# Patient Record
Sex: MALE | Race: WHITE | NOT HISPANIC OR LATINO | URBAN - METROPOLITAN AREA
[De-identification: names, ages, dates, MRNs, and addresses within clinical notes are randomized per-mention and may not be internally consistent; named-entity substitution may affect disease eponyms.]

---

## 2019-04-23 ENCOUNTER — PRE VISIT (OUTPATIENT)
Dept: UROLOGY | Facility: CLINIC | Age: 34
End: 2019-04-23

## 2019-04-23 NOTE — TELEPHONE ENCOUNTER
MEDICAL RECORDS REQUEST   [facility]  Urology Clinic  [address] [place] [address]  PHONE: [phone]  Fax: [phone]        FUTURE VISIT INFORMATION                                                   RADHA Mcdaniel: 1985 scheduled for future visit at [facility]    APPOINTMENT INFORMATION:    Date: 19 5PM    Provider:  Nida Kimble RN    Reason for Visit/Diagnosis: Blood in semen, still discolored after a couple of weeks. Having discomfort in RT testicle.     REFERRAL INFORMATION:    Referring provider:  Self    Specialty: N/A    Referring providers clinic:  N/A    Clinic contact number:  N/A    RECORDS REQUESTED FOR VISIT                                                     NOTES  STATUS/DETAILS   OFFICE NOTE from referring provider  no   OFFICE NOTE from other specialist  no   DISCHARGE SUMMARY from hospital  no   DISCHARGE REPORT from the ER  no   OPERATIVE REPORT  no   MEDICATION LIST  no       PRE-VISIT CHECKLIST      Record collection complete Yes - Per pt no outside recs   Appointment appropriately scheduled           (right time/right provider) Yes   MyChart activation If no, please explain: In Process   Questionnaire complete If no, please explain: In Process     Completed by: Glenis Montesinos

## 2019-05-06 NOTE — PROGRESS NOTES
"        Chief Complaint:   Hematospermia and right testicular pain          History of Present Illness:   Terrance Larsen is a 33 year old male who reports that he experienced blood in his semen for 3 weeks duration. However, this has since ceased. He states that this was initially brian red blood, but over the course of the 3 weeks, this became a darker, \"dried blood.\" He has also had these \"dried blood\" episodes in the past that lasted one day at a time. He also had right-sided testicular pain in association with this, and was seen at an outside clinic. STI testing was done and was negative. This pain has now also ceased. His semen is now mostly clear. He denies pain with urination. He states that he currently takes amlodipine for hypertension, and also takes fish oil daily.          Past Medical History:   Amlodipine 5 mg tablet         Past Surgical History:   Reviewed--none significant         Medications     Current Outpatient Medications   Medication     amLODIPine (NORVASC) 5 MG tablet     No current facility-administered medications for this visit.             Family History:   Reviewed--none pertinent         Social History:     Social History     Socioeconomic History     Marital status: Single     Spouse name: Not on file     Number of children: Not on file     Years of education: Not on file     Highest education level: Not on file   Occupational History     Not on file   Social Needs     Financial resource strain: Not on file     Food insecurity:     Worry: Not on file     Inability: Not on file     Transportation needs:     Medical: Not on file     Non-medical: Not on file   Tobacco Use     Smoking status: Former Smoker     Smokeless tobacco: Former User   Substance and Sexual Activity     Alcohol use: Not on file     Drug use: Not on file     Sexual activity: Not on file   Lifestyle     Physical activity:     Days per week: Not on file     Minutes per session: Not on file     Stress: Not on file " "  Relationships     Social connections:     Talks on phone: Not on file     Gets together: Not on file     Attends Yarsani service: Not on file     Active member of club or organization: Not on file     Attends meetings of clubs or organizations: Not on file     Relationship status: Not on file     Intimate partner violence:     Fear of current or ex partner: Not on file     Emotionally abused: Not on file     Physically abused: Not on file     Forced sexual activity: Not on file   Other Topics Concern     Not on file   Social History Narrative     Not on file            Allergies:   Codeine         Review of Systems:  From intake questionnaire   Negative 14 system review except as noted on HPI, nurse's note.         Physical Exam:   Patient is a 33 year old male   Vitals: Blood pressure 138/81, pulse 69, height 1.905 m (6' 3\"), weight 104.3 kg (230 lb).  General Appearance Adult: Alert, no acute distress, oriented  Lungs: no respiratory distress, or pursed lip breathing  Heart: No obvious jugular venous distension present  Abdomen: soft, nontender, no organomegaly or masses, Body mass index is 28.75 kg/m .  Skin: no suspicious lesions or rashes  Neuro: Alert, oriented, speech and mentation normal  : deferred      Labs and Pathology:    I personally reviewed all applicable laboratory data       Imaging:    I personally reviewed all applicable imaging          Assessment and Plan:     Assessment:   33 year old male with a history of hematospermia for three weeks, along with right-sided testicular pain, which have both now ceased. We discussed that these conditions are oftentimes benign and self-limiting.         Plan:   -Monitor this for now  -If hematospermia begins again, and lasts for longer than one month, may pursue a TRUS or MRI  -If testicular pain returns, can pursue scrotal US         Nida Kimble, CNP  Department of Urology   "

## 2019-05-08 ENCOUNTER — PRE VISIT (OUTPATIENT)
Dept: UROLOGY | Facility: CLINIC | Age: 34
End: 2019-05-08

## 2019-05-08 ENCOUNTER — OFFICE VISIT (OUTPATIENT)
Dept: UROLOGY | Facility: CLINIC | Age: 34
End: 2019-05-08
Payer: COMMERCIAL

## 2019-05-08 VITALS
BODY MASS INDEX: 28.6 KG/M2 | WEIGHT: 230 LBS | DIASTOLIC BLOOD PRESSURE: 81 MMHG | HEART RATE: 69 BPM | SYSTOLIC BLOOD PRESSURE: 138 MMHG | HEIGHT: 75 IN

## 2019-05-08 DIAGNOSIS — R36.1 HEMATOSPERMIA: Primary | ICD-10-CM

## 2019-05-08 DIAGNOSIS — N50.819 TESTICULAR PAIN: ICD-10-CM

## 2019-05-08 RX ORDER — AMLODIPINE BESYLATE 5 MG/1
5 TABLET ORAL
COMMUNITY
Start: 2017-12-16

## 2019-05-08 ASSESSMENT — ENCOUNTER SYMPTOMS
LEG PAIN: 0
EXERCISE INTOLERANCE: 0
SLEEP DISTURBANCES DUE TO BREATHING: 0
HYPOTENSION: 0
ORTHOPNEA: 0
PALPITATIONS: 0
LIGHT-HEADEDNESS: 0
HYPERTENSION: 1
SYNCOPE: 0

## 2019-05-08 ASSESSMENT — MIFFLIN-ST. JEOR: SCORE: 2073.9

## 2019-05-08 ASSESSMENT — PAIN SCALES - GENERAL: PAINLEVEL: NO PAIN (0)

## 2019-05-08 NOTE — TELEPHONE ENCOUNTER
Chief Complaint : New-Referred by Self    New Hx/Sx: Hematospermia/R testicle pain     Records/Orders/Proced: N/A    Pt Contacted: N/A    At Rooming: Normal

## 2019-05-08 NOTE — LETTER
Date:May 13, 2019      Patient was self referred, no letter generated. Do not send.        [facility] Information

## 2019-05-08 NOTE — LETTER
"5/8/2019       RE: Terrance Larsen  [address] [address]     Dear Colleague,    Thank you for referring your patient, Terrance Larsen, to the [facility] UROLOGY AND INST FOR PROSTATE AND UROLOGIC CANCERS at [facility]. Please see a copy of my visit note below.            Chief Complaint:   Hematospermia and right testicular pain          History of Present Illness:   Terrance Larsen is a 33 year old male who reports that he experienced blood in his semen for 3 weeks duration. However, this has since ceased. He states that this was initially brian red blood, but over the course of the 3 weeks, this became a darker, \"dried blood.\" He has also had these \"dried blood\" episodes in the past that lasted one day at a time. He also had right-sided testicular pain in association with this, and was seen at an outside clinic. STI testing was done and was negative. This pain has now also ceased. His semen is now mostly clear. He denies pain with urination. He states that he currently takes amlodipine for hypertension, and also takes fish oil daily.          Past Medical History:   Amlodipine 5 mg tablet         Past Surgical History:   Reviewed--none significant         Medications     Current Outpatient Medications   Medication     amLODIPine (NORVASC) 5 MG tablet     No current facility-administered medications for this visit.             Family History:   Reviewed--none pertinent         Social History:     Social History     Socioeconomic History     Marital status: Single     Spouse name: Not on file     Number of children: Not on file     Years of education: Not on file     Highest education level: Not on file   Occupational History     Not on file   Social Needs     Financial resource strain: Not on file     Food insecurity:     Worry: Not on file     Inability: Not on file     Transportation needs:     Medical: Not on file     Non-medical: Not on file   Tobacco Use     Smoking " "status: Former Smoker     Smokeless tobacco: Former User   Substance and Sexual Activity     Alcohol use: Not on file     Drug use: Not on file     Sexual activity: Not on file   Lifestyle     Physical activity:     Days per week: Not on file     Minutes per session: Not on file     Stress: Not on file   Relationships     Social connections:     Talks on phone: Not on file     Gets together: Not on file     Attends Scientologist service: Not on file     Active member of club or organization: Not on file     Attends meetings of clubs or organizations: Not on file     Relationship status: Not on file     Intimate partner violence:     Fear of current or ex partner: Not on file     Emotionally abused: Not on file     Physically abused: Not on file     Forced sexual activity: Not on file   Other Topics Concern     Not on file   Social History Narrative     Not on file            Allergies:   Codeine         Review of Systems:  From intake questionnaire   Negative 14 system review except as noted on HPI, nurse's note.         Physical Exam:   Patient is a 33 year old male   Vitals: Blood pressure 138/81, pulse 69, height 1.905 m (6' 3\"), weight 104.3 kg (230 lb).  General Appearance Adult: Alert, no acute distress, oriented  Lungs: no respiratory distress, or pursed lip breathing  Heart: No obvious jugular venous distension present  Abdomen: soft, nontender, no organomegaly or masses, Body mass index is 28.75 kg/m .  Skin: no suspicious lesions or rashes  Neuro: Alert, oriented, speech and mentation normal  : deferred      Labs and Pathology:    I personally reviewed all applicable laboratory data       Imaging:    I personally reviewed all applicable imaging          Assessment and Plan:     Assessment:   33 year old male with a history of hematospermia for three weeks, along with right-sided testicular pain, which have both now ceased. We discussed that these conditions are oftentimes benign and self-limiting.       "   Plan:   -Monitor this for now  -If hematospermia begins again, and lasts for longer than one month, may pursue a TRUS or MRI  -If testicular pain returns, can pursue scrotal US         Nida Kimble CNP  Department of Urology     Again, thank you for allowing me to participate in the care of your patient.      Sincerely,    Nida Kimble, CNP

## 2019-09-09 ENCOUNTER — NURSE TRIAGE (OUTPATIENT)
Dept: NURSING | Facility: CLINIC | Age: 34
End: 2019-09-09

## 2019-09-09 ENCOUNTER — OFFICE VISIT (OUTPATIENT)
Dept: UROLOGY | Facility: CLINIC | Age: 34
End: 2019-09-09
Payer: COMMERCIAL

## 2019-09-09 ENCOUNTER — ANCILLARY PROCEDURE (OUTPATIENT)
Dept: ULTRASOUND IMAGING | Facility: CLINIC | Age: 34
End: 2019-09-09
Attending: NURSE PRACTITIONER
Payer: COMMERCIAL

## 2019-09-09 VITALS
BODY MASS INDEX: 26.73 KG/M2 | DIASTOLIC BLOOD PRESSURE: 80 MMHG | HEART RATE: 74 BPM | SYSTOLIC BLOOD PRESSURE: 142 MMHG | HEIGHT: 75 IN | WEIGHT: 215 LBS

## 2019-09-09 DIAGNOSIS — R36.1 HEMATOSPERMIA: ICD-10-CM

## 2019-09-09 DIAGNOSIS — R36.1 HEMATOSPERMIA: Primary | ICD-10-CM

## 2019-09-09 RX ORDER — MULTIVITAMIN/IRON/FOLIC ACID 18MG-0.4MG
1 TABLET ORAL
COMMUNITY

## 2019-09-09 ASSESSMENT — PAIN SCALES - GENERAL: PAINLEVEL: NO PAIN (0)

## 2019-09-09 ASSESSMENT — MIFFLIN-ST. JEOR: SCORE: 2000.86

## 2019-09-09 NOTE — NURSING NOTE
"Return-  Hx of hematospermia/ R testicular pain     He states over the past month he has had blood in his semen, starting early August, and thinks it is getting worse.  He denies any pain with erections or climax along with trouble getting or maintaining erections.      Chief Complaint   Patient presents with     RECHECK     Hematospermia       Blood pressure (!) 142/80, pulse 74, height 1.905 m (6' 3\"), weight 97.5 kg (215 lb). Body mass index is 26.87 kg/m .    There is no problem list on file for this patient.      Allergies   Allergen Reactions     Codeine Rash       Current Outpatient Medications   Medication Sig Dispense Refill     amLODIPine (NORVASC) 5 MG tablet Take 5 mg by mouth       Multiple Vitamins-Minerals (CENTROVITE) TABS Take 1 tablet by mouth         Social History     Tobacco Use     Smoking status: Former Smoker     Smokeless tobacco: Former User   Substance Use Topics     Alcohol use: Yes     Drug use: None       Juan Jose Croft, EMT, EMT  9/9/2019  3:09 PM      "

## 2019-09-09 NOTE — TELEPHONE ENCOUNTER
[facility]: Nurse Triage Note  SITUATION/BACKGROUND                                                      Terrance Larsen is a 34 year old male who calls with blood in semen.    Description:  Blood in semen characteristics change--color will change from bright red to brownish purple/watery   Onset/duration:  Been ongoing for the last month  Precip. factors:  Experienced blood in semen last spring  Associated symptoms:  No force behind ejaculation   Improves/worsens symptoms:  no  Pain scale (0-10)   0/10    MEDICATIONS:   Taking medication(s) as prescribed? Yes  Taking over the counter medication(s?) No  Any barriers to taking medication(s) as prescribed?  No  Medication(s) improving/managing symptoms?  No    Allergies:   Allergies   Allergen Reactions     Codeine Rash       ASSESSMENT      34 year old male calls with blood in his semen x 4 weeks.  He states his semen is very watery.  He states -No force behind ejaculation.  The color changes week to week  It can be bright red in color to dark red to purplish.  He denies pain.  States he may experience a little discomfort in his right scrotum from time to time.  He denies swelling or fever.      He is scheduled for a f/u with Nida Kimble CNP at the end of the month.  He would really like to see her when he is experieincing the bloody discharge.     Will forward to urology to see if he can be added in to her clinic today...     RECOMMENDATION/PLAN                                                      RECOMMENDED DISPOSITION:  See above  Will comply with recommendation: Yes    If further questions/concerns or if symptoms do not improve, worsen or new symptoms develop, call your PCP or [phone] to talk with the Resident on call, as soon as possible.    Guideline used: 528  Telephone Triage Protocols for Nurses, Fifth Edition, Olimpia Vizcarra, RN, RN

## 2019-09-09 NOTE — PROGRESS NOTES
"        Chief Complaint:   Hematospermia and right testicular pain          History of Present Illness:   Terrance Larsen is a 33 year old male who presents for follow up regarding hematospermia. I last saw him in the urology clinic on 5/8/19 for this issue. Prior this visit, he had experienced hematospermia for 3 weeks, but it had resolved by the time I saw him. At that visit, he stated that this was initially brian red blood, but over the course of the 3 weeks, this became a darker, \"dried blood.\" He has also had these \"dried blood\" episodes in the past that lasted one day at a time. He also had right-sided testicular pain in association with this, and was seen at an outside clinic. STI testing was done and was negative. This pain had also ceased.     Today, he states that his hematospermia returned in early August, initially with small pieces of brown material in his semen. Over the past several weeks, this has progressed from streaks of brian blood present to his entire ejaculate volume being dark maroon in color. He provides a photo today of his semen's dark maroon/brown appearance. He does also acknowledge some mild, ongoing right-sided scrotal pain. This is present today. The force of his ejaculation has also reduced, and he states that his semen now \"dribbles out.\" He otherwise denies any other symptoms, including abdominal, pelvic, or rectal pain. He denies any associated urinary symptoms.          Past Medical History:   Amlodipine 5 mg tablet         Past Surgical History:   Reviewed--none significant         Medications     Current Outpatient Medications   Medication     amLODIPine (NORVASC) 5 MG tablet     Multiple Vitamins-Minerals (CENTROVITE) TABS     No current facility-administered medications for this visit.             Family History:   Reviewed--none pertinent         Social History:     Social History     Socioeconomic History     Marital status: Single     Spouse name: Not on file     Number of " "children: Not on file     Years of education: Not on file     Highest education level: Not on file   Occupational History     Not on file   Social Needs     Financial resource strain: Not on file     Food insecurity:     Worry: Not on file     Inability: Not on file     Transportation needs:     Medical: Not on file     Non-medical: Not on file   Tobacco Use     Smoking status: Former Smoker     Smokeless tobacco: Former User   Substance and Sexual Activity     Alcohol use: Yes     Drug use: Not on file     Sexual activity: Not Currently   Lifestyle     Physical activity:     Days per week: Not on file     Minutes per session: Not on file     Stress: Not on file   Relationships     Social connections:     Talks on phone: Not on file     Gets together: Not on file     Attends Mandaen service: Not on file     Active member of club or organization: Not on file     Attends meetings of clubs or organizations: Not on file     Relationship status: Not on file     Intimate partner violence:     Fear of current or ex partner: Not on file     Emotionally abused: Not on file     Physically abused: Not on file     Forced sexual activity: Not on file   Other Topics Concern     Parent/sibling w/ CABG, MI or angioplasty before 65F 55M? Not Asked   Social History Narrative     Not on file            Allergies:   Codeine         Review of Systems:  From intake questionnaire   Negative 14 system review except as noted on HPI, nurse's note.         Physical Exam:   Patient is a 33 year old male   Vitals: Blood pressure (!) 142/80, pulse 74, height 1.905 m (6' 3\"), weight 97.5 kg (215 lb).  General Appearance Adult: Alert, no acute distress, oriented  Lungs: no respiratory distress, or pursed lip breathing  Heart: No obvious jugular venous distension present  Abdomen: soft, nontender, no organomegaly or masses, Body mass index is 26.87 kg/m .  Skin: no suspicious lesions or rashes  : Testicles of normal size, no indurated areas on " exam. Nontender with exam.   DENISE: Prostate firm, smooth, nontender with palpation.       Labs and Pathology:    I personally reviewed all applicable laboratory data       Imaging:    I personally reviewed all applicable imaging          Assessment and Plan:     Assessment:   33 year old male with recurrent hematospermia that has now lasted longer than one month. He also reports mild pain on the right side of his scrotum. No obvious abnormality on exam today. Given the duration of his current symptoms, further evaluation is needed.       Plan:   -Obtain a testicular ultrasound to rule out pathology within the scrotum.  -Will also have patient return for a TRUS in clinic with Dr. Hawley to evaluate the prostate and surrounding structures. Next steps to be determined at that time.          Nida Kimble, CNP  Department of Urology

## 2019-09-09 NOTE — PATIENT INSTRUCTIONS
UROLOGY CLINIC VISIT PATIENT INSTRUCTIONS    1) We will obtain a scrotal ultrasound today to evaluate these structures for abnormality.  2) We will also have you return for a transrectal ultrasound with Dr. Hawley to look closely at the prostate and surrounding structures.    If you have any issues, questions or concerns in the meantime, do not hesitate to contact us at [phone] or via [employer].     It was a pleasure meeting with you today.  Thank you for allowing me and my team the privilege of caring for you today.  YOU are the reason we are here, and I truly hope we provided you with the excellent service you deserve.  Please let us know if there is anything else we can do for you so that we can be sure you are leaving completely satisfied with your care experience.    Nida Kimble, CNP

## 2019-09-09 NOTE — LETTER
"9/9/2019       RE: Terrance Larsen  [address] [address]     Dear Colleague,    Thank you for referring your patient, Terrance Larsen, to the [facility] UROLOGY AND [facility] FOR PROSTATE AND UROLOGIC CANCERS at [facility]. Please see a copy of my visit note below.            Chief Complaint:   Hematospermia and right testicular pain          History of Present Illness:   Terrance Larsen is a 33 year old male who presents for follow up regarding hematospermia. I last saw him in the urology clinic on 5/8/19 for this issue. Prior this visit, he had experienced hematospermia for 3 weeks, but it had resolved by the time I saw him. At that visit, he stated that this was initially brian red blood, but over the course of the 3 weeks, this became a darker, \"dried blood.\" He has also had these \"dried blood\" episodes in the past that lasted one day at a time. He also had right-sided testicular pain in association with this, and was seen at an outside clinic. STI testing was done and was negative. This pain had also ceased.     Today, he states that his hematospermia returned in early August, initially with small pieces of brown material in his semen. Over the past several weeks, this has progressed from streaks of brian blood present to his entire ejaculate volume being dark maroon in color. He provides a photo today of his semen's dark maroon/brown appearance. He does also acknowledge some mild, ongoing right-sided scrotal pain. This is present today. The force of his ejaculation has also reduced, and he states that his semen now \"dribbles out.\" He otherwise denies any other symptoms, including abdominal, pelvic, or rectal pain. He denies any associated urinary symptoms.          Past Medical History:   Amlodipine 5 mg tablet         Past Surgical History:   Reviewed--none significant         Medications     Current Outpatient Medications   Medication     amLODIPine (NORVASC) 5 MG " "tablet     Multiple Vitamins-Minerals (CENTROVITE) TABS     No current facility-administered medications for this visit.             Family History:   Reviewed--none pertinent         Social History:     Social History     Socioeconomic History     Marital status: Single     Spouse name: Not on file     Number of children: Not on file     Years of education: Not on file     Highest education level: Not on file   Occupational History     Not on file   Social Needs     Financial resource strain: Not on file     Food insecurity:     Worry: Not on file     Inability: Not on file     Transportation needs:     Medical: Not on file     Non-medical: Not on file   Tobacco Use     Smoking status: Former Smoker     Smokeless tobacco: Former User   Substance and Sexual Activity     Alcohol use: Yes     Drug use: Not on file     Sexual activity: Not Currently   Lifestyle     Physical activity:     Days per week: Not on file     Minutes per session: Not on file     Stress: Not on file   Relationships     Social connections:     Talks on phone: Not on file     Gets together: Not on file     Attends Holiness service: Not on file     Active member of club or organization: Not on file     Attends meetings of clubs or organizations: Not on file     Relationship status: Not on file     Intimate partner violence:     Fear of current or ex partner: Not on file     Emotionally abused: Not on file     Physically abused: Not on file     Forced sexual activity: Not on file   Other Topics Concern     Parent/sibling w/ CABG, MI or angioplasty before 65F 55M? Not Asked   Social History Narrative     Not on file            Allergies:   Codeine         Review of Systems:  From intake questionnaire   Negative 14 system review except as noted on HPI, nurse's note.         Physical Exam:   Patient is a 33 year old male   Vitals: Blood pressure (!) 142/80, pulse 74, height 1.905 m (6' 3\"), weight 97.5 kg (215 lb).  General Appearance Adult: Alert, " no acute distress, oriented  Lungs: no respiratory distress, or pursed lip breathing  Heart: No obvious jugular venous distension present  Abdomen: soft, nontender, no organomegaly or masses, Body mass index is 26.87 kg/m .  Skin: no suspicious lesions or rashes  : Testicles of normal size, no indurated areas on exam. Nontender with exam.   DENISE: Prostate firm, smooth, nontender with palpation.       Labs and Pathology:    I personally reviewed all applicable laboratory data       Imaging:    I personally reviewed all applicable imaging          Assessment and Plan:     Assessment:   33 year old male with recurrent hematospermia that has now lasted longer than one month. He also reports mild pain on the right side of his scrotum. No obvious abnormality on exam today. Given the duration of his current symptoms, further evaluation is needed.       Plan:   -Obtain a testicular ultrasound to rule out pathology within the scrotum.  -Will also have patient return for a TRUS in clinic with Dr. Hawley to evaluate the prostate and surrounding structures. Next steps to be determined at that time.          Nida Kimble CNP  Department of Urology     Again, thank you for allowing me to participate in the care of your patient.      Sincerely,    Nida Kimble CNP

## 2019-10-03 ENCOUNTER — PRE VISIT (OUTPATIENT)
Dept: UROLOGY | Facility: CLINIC | Age: 34
End: 2019-10-03

## 2019-10-03 NOTE — TELEPHONE ENCOUNTER
Reason for Visit: TRUS (no bx, no prep)    Diagnosis: hematospermia    Orders/Procedures/Records: in system    Contact Patient: n/a    Rooming Requirements: TRUS      Kitty Diaz LPN  10/03/19  4:42 PM

## 2019-10-18 ENCOUNTER — OFFICE VISIT (OUTPATIENT)
Dept: UROLOGY | Facility: CLINIC | Age: 34
End: 2019-10-18
Attending: NURSE PRACTITIONER
Payer: COMMERCIAL

## 2019-10-18 VITALS
WEIGHT: 215 LBS | SYSTOLIC BLOOD PRESSURE: 133 MMHG | HEIGHT: 75 IN | DIASTOLIC BLOOD PRESSURE: 68 MMHG | BODY MASS INDEX: 26.73 KG/M2 | HEART RATE: 74 BPM

## 2019-10-18 DIAGNOSIS — R36.1 HEMATOSPERMIA: ICD-10-CM

## 2019-10-18 LAB
ALBUMIN UR-MCNC: NEGATIVE MG/DL
APPEARANCE UR: CLEAR
BILIRUB UR QL STRIP: NEGATIVE
COLOR UR AUTO: NORMAL
GLUCOSE UR STRIP-MCNC: NEGATIVE MG/DL
HGB UR QL STRIP: NEGATIVE
KETONES UR STRIP-MCNC: NEGATIVE MG/DL
LEUKOCYTE ESTERASE UR QL STRIP: NEGATIVE
NITRATE UR QL: NEGATIVE
PH UR STRIP: 6 PH (ref 5–7)
RBC #/AREA URNS AUTO: <1 /HPF (ref 0–2)
SOURCE: NORMAL
SP GR UR STRIP: 1 (ref 1–1.03)
UROBILINOGEN UR STRIP-MCNC: 0 MG/DL (ref 0–2)
WBC #/AREA URNS AUTO: <1 /HPF (ref 0–5)

## 2019-10-18 ASSESSMENT — PAIN SCALES - GENERAL: PAINLEVEL: NO PAIN (0)

## 2019-10-18 ASSESSMENT — MIFFLIN-ST. JEOR: SCORE: 2000.86

## 2019-10-18 NOTE — NURSING NOTE
"Return-  TRUS of Prostate.  Hx of hematospermia/ R testicular pain     He states the hematospermia and pain are both gone, for the past month or so.      Chief Complaint   Patient presents with     RECHECK     Prostate TRUS       Blood pressure 133/68, pulse 74, height 1.905 m (6' 3\"), weight 97.5 kg (215 lb). Body mass index is 26.87 kg/m .    There is no problem list on file for this patient.      Allergies   Allergen Reactions     Codeine Rash       Current Outpatient Medications   Medication Sig Dispense Refill     amLODIPine (NORVASC) 5 MG tablet Take 5 mg by mouth       Multiple Vitamins-Minerals (CENTROVITE) TABS Take 1 tablet by mouth         Social History     Tobacco Use     Smoking status: Former Smoker     Smokeless tobacco: Former User   Substance Use Topics     Alcohol use: Yes     Drug use: None       Juan Jose Croft, EMT, EMT  10/18/2019  10:00 AM      "

## 2019-10-18 NOTE — LETTER
10/18/2019       RE: Terrance Larsen  [address] [facility] [address]     Dear Colleague,    Thank you for referring your patient, Terrance Larsen, to the [facility] UROLOGY AND INST FOR PROSTATE AND UROLOGIC CANCERS at [facility]. Please see a copy of my visit note below.    Procedure Note    Pre-operative diagnosis: Hematospermia    Post-operative diagnosis Same    Procedure: Trans-rectal ultrasound of the prostate   Surgeon: Harjeet Hawley MD   Assistants(s): None    Estimated blood loss: None    Specimens: None   Findings: As below     Terrance Larsen is a 34 year old male is in for his TRUS of the prostate.  Reason for the ultrasound is:      Procedure:  Pt was positioned in left lateral decubitus position.   DENISE indicates a smooth, symmetrical prostate except.     The trans-rectal ultrasound probe was inserted and the prostate examined with biplanar ultrasound.    His prostate measures approx. 24 cc.  Capsule: Distinct   SVs:  Normal   Calcifications: a few calcification in the central prostate, nonsignificant.  Hypoechoic areas: None     No complications were noted, he tolerated the procedure well.      Assessment-  Hematospermia, normal rectal exam.  No history of gross hematuria.  Resolved testis pain.    Plan-  Advised observation.  UA today, chart check results.  PRN urology follow-up.    Shamika YUNG           Again, thank you for allowing me to participate in the care of your patient.      Sincerely,    Harjeet Hawley MD

## 2019-10-18 NOTE — PROGRESS NOTES
Procedure Note    Pre-operative diagnosis: Hematospermia    Post-operative diagnosis Same    Procedure: Trans-rectal ultrasound of the prostate   Surgeon: Harjeet Hawley MD   Assistants(s): None    Estimated blood loss: None    Specimens: None   Findings: As below     Terrance Larsen is a 34 year old male is in for his TRUS of the prostate.  Reason for the ultrasound is:      Procedure:  Pt was positioned in left lateral decubitus position.   DENISE indicates a smooth, symmetrical prostate except.     The trans-rectal ultrasound probe was inserted and the prostate examined with biplanar ultrasound.    His prostate measures approx. 24 cc.  Capsule: Distinct   SVs:  Normal   Calcifications: a few calcification in the central prostate, nonsignificant.  Hypoechoic areas: None     No complications were noted, he tolerated the procedure well.      Assessment-  Hematospermia, normal rectal exam.  No history of gross hematuria.  Resolved testis pain.    Plan-  Advised observation.  UA today, chart check results.  PRN urology follow-up.    Shamika YUNG

## 2020-02-23 ENCOUNTER — HEALTH MAINTENANCE LETTER (OUTPATIENT)
Age: 35
End: 2020-02-23

## 2020-12-13 ENCOUNTER — HEALTH MAINTENANCE LETTER (OUTPATIENT)
Age: 35
End: 2020-12-13

## 2021-03-15 ENCOUNTER — IMMUNIZATION (OUTPATIENT)
Dept: NURSING | Facility: CLINIC | Age: 36
End: 2021-03-15
Payer: COMMERCIAL

## 2021-03-15 PROCEDURE — 0001A PR COVID VAC PFIZER DIL RECON 30 MCG/0.3 ML IM: CPT

## 2021-03-15 PROCEDURE — 91300 PR COVID VAC PFIZER DIL RECON 30 MCG/0.3 ML IM: CPT

## 2021-04-05 ENCOUNTER — IMMUNIZATION (OUTPATIENT)
Dept: NURSING | Facility: CLINIC | Age: 36
End: 2021-04-05
Attending: INTERNAL MEDICINE
Payer: COMMERCIAL

## 2021-04-05 PROCEDURE — 91300 PR COVID VAC PFIZER DIL RECON 30 MCG/0.3 ML IM: CPT

## 2021-04-05 PROCEDURE — 0002A PR COVID VAC PFIZER DIL RECON 30 MCG/0.3 ML IM: CPT

## 2021-04-11 ENCOUNTER — HEALTH MAINTENANCE LETTER (OUTPATIENT)
Age: 36
End: 2021-04-11

## 2021-07-10 NOTE — NURSING NOTE
Chief Complaint   Patient presents with     Consult     Blood in semen        Shayy Owen MA    Morgan Gamble(Attending)

## 2021-09-26 ENCOUNTER — HEALTH MAINTENANCE LETTER (OUTPATIENT)
Age: 36
End: 2021-09-26

## 2022-03-10 ENCOUNTER — TELEPHONE (OUTPATIENT)
Dept: UROLOGY | Facility: CLINIC | Age: 37
End: 2022-03-10
Payer: COMMERCIAL

## 2022-03-10 ENCOUNTER — PRE VISIT (OUTPATIENT)
Dept: UROLOGY | Facility: CLINIC | Age: 37
End: 2022-03-10
Payer: COMMERCIAL

## 2022-03-10 ENCOUNTER — NURSE TRIAGE (OUTPATIENT)
Dept: UROLOGY | Facility: CLINIC | Age: 37
End: 2022-03-10
Payer: COMMERCIAL

## 2022-03-10 DIAGNOSIS — N50.82 SCROTAL PAIN: Primary | ICD-10-CM

## 2022-03-10 DIAGNOSIS — N50.82 SCROTAL PAIN: ICD-10-CM

## 2022-03-10 DIAGNOSIS — N45.1 EPIDIDYMITIS: Primary | ICD-10-CM

## 2022-03-10 RX ORDER — DOXYCYCLINE HYCLATE 100 MG
100 TABLET ORAL 2 TIMES DAILY
Qty: 28 TABLET | Refills: 0 | Status: SHIPPED | OUTPATIENT
Start: 2022-03-10 | End: 2022-04-05

## 2022-03-10 NOTE — TELEPHONE ENCOUNTER
Spoke to pt. Informed him of corrected order in place. Pt verbalized understanding.     Malaika Mayorga RN MSN

## 2022-03-10 NOTE — TELEPHONE ENCOUNTER
Reason for visit: consult      Dx/Hx/Sx: testicular pain     Records/imaging/labs/orders: testicular US not done     At Rooming: standard

## 2022-03-10 NOTE — TELEPHONE ENCOUNTER
[facility] Call Center    Phone Message    May a detailed message be left on voicemail: yes     Reason for Call: Symptoms or Concerns     If patient has red-flag symptoms, warm transfer to triage line    Current symptom or concern: Right testicular pain    Symptoms have been present for: Been going on for some time and recently has been getting worse.    Has patient previously been seen for this? No    Are there any new or worsening symptoms? No      Action Taken: Message routed to:  [facility]    Travel Screening: Not Applicable

## 2022-03-10 NOTE — PROGRESS NOTES
Chandan, MD Mukesh Al Angie J RN  Caller: Unspecified (Today,  9:14 AM)  Scrotal ultrasound is fine.   I recommended Aleve or ibuprofen.   Can you send him a course of doxycycline to cover possible epididymitis- 100mg BID x 7d.   It doesn't sound emergent, but he can come see me or Betty Contreras, urology PA probably sooner in [place]- there are often open slots there.     Attempted to call pt. Left detailed message to call clinic back at [phone].   To inform pt of orders and confirm preferred pharmacy. Also, held appointment with Dr. Hawley at [facility] on 3/11/22 at 820 am.

## 2022-03-10 NOTE — TELEPHONE ENCOUNTER
Spoke to pt. Pt reports pain to right testicle that returned a few weeks ago. Has history of this pain, but relates that to scratching too hard. A few weeks ago it felt like someone is squeezing his right testicle and would go away for a few minutes. Pain has now progressed to be pressure discomfort constantly. Pt wear long johns daily due to cold weather. Pain was disrupting his ability to fall asleep and pt took ibuprofen for relief. Pt rated pain up 2/10. No redness. Pt reports it might be a little swollen. Urinating well. Urine is yellow and clear. No abdominal pain or fever. Pt confirmed that the pain is currently present and has been constant over an hour. Chart and problems list reviewed.    Pt decline advice to go to ER or UC due to financial reasons.     Scheduled appointment with Nida Kimble on 4/5/22.     Malaika Mayorga RN MSN      Reason for Disposition    Constant pain in scrotum or testicle and present > 1 hour    Protocols used: SCROTAL PAIN-A-OH

## 2022-03-10 NOTE — TELEPHONE ENCOUNTER
[facility] Call Center    Phone Message    May a detailed message be left on voicemail: yes     Reason for Call: Other: Terrance called trying to make his US appt and the imaging dept stated that they can not schedule him when the order says abdomen/ pelvis US when it needs to say testicular and scrotum US. Please call Terrance back when it is corrected, thank you!     Action Taken: Message routed to:  [facility]    Travel Screening: Not Applicable

## 2022-03-11 ENCOUNTER — ANCILLARY PROCEDURE (OUTPATIENT)
Dept: ULTRASOUND IMAGING | Facility: CLINIC | Age: 37
End: 2022-03-11
Attending: UROLOGY
Payer: COMMERCIAL

## 2022-03-11 DIAGNOSIS — N50.82 SCROTAL PAIN: ICD-10-CM

## 2022-03-11 PROCEDURE — 76870 US EXAM SCROTUM: CPT | Mod: GC | Performed by: STUDENT IN AN ORGANIZED HEALTH CARE EDUCATION/TRAINING PROGRAM

## 2022-03-13 NOTE — RESULT ENCOUNTER NOTE
Dear Terrance,     Here are your recent results.     Scrotal ultrasound was normal, no abnormalities seen.  I am not sure the cause of the scrotal pain.  I would advise observation for now.  It's OK to take Tylenol or ibuprofen on days where there is more discomfort.  Let us know if this is worsening with time.    Please let us know if you have any questions -   Thank You -  Shamika YUNG

## 2022-04-04 NOTE — PROGRESS NOTES
"     Assessment and Plan:     Assessment: 36 year old male with a history of testicular pain and hematospermia, with recurrent pain that resolved partially with a course of doxycycline to cover epididymitis. Scrotal US negative. Continues to have intermittent symptoms. Differentials at this point include residual epididymitis versus pelvic floor tension and we discussed both today. We can trial an additional course of doxycycline to see how his symptoms respond. Also encouraged him to increase his physical activity and avoid tight, constricting clothing. Could involve PFPT if symptoms continue.     Plan:  -Take additional 7-day course of doxycycline.  -Increase physical activity.   -Follow up with me as needed. If symptoms continue despite the above, will consider PFPT referral.       Nida Kimble, CNP  Department of Urology           Chief Complaint:   Testicular pain         History of Present Illness:    Terrance Larsen is a 36 year old male with a history of testicular pain and hematospermia, s/p negative TRUS with Dr. Hawley and testicular ultrasound showing mild bilateral varicoceles in 2019. He presents for follow up today regarding recurrent scrotal pain. Repeat ultrasound obtained last month and is WNL. He did take a course of doxycycline for epididymitis and noticed some improvement. Now, for the last few days, his pain has come back intermittently.     His job is more sedentary during the winter, so he is starting to increase his activity now. He has gained a little weight and has noticed some pairs of his pants fit a little tighter. His work vehicle is also small and he has to \"cram in.\" Denies any ongoing hematospermia.          Past Medical History:   No past medical history on file.         Past Surgical History:   No past surgical history on file.         Medications     Current Outpatient Medications   Medication     amLODIPine (NORVASC) 5 MG tablet     doxycycline hyclate (VIBRA-TABS) 100 MG " "tablet     Multiple Vitamins-Minerals (CENTROVITE) TABS     doxycycline hyclate (VIBRA-TABS) 100 MG tablet     No current facility-administered medications for this visit.            Allergies:   Codeine         Review of Systems:  From intake questionnaire   Negative 14 system review except as noted on HPI, nurse's note.         Physical Exam:   Patient is a 36 year old  male   Vitals: Blood pressure 132/81, pulse 80, height 1.905 m (6' 3\"), weight 104.3 kg (230 lb).  General Appearance Adult: Alert, no acute distress, oriented  Lungs: no respiratory distress, or pursed lip breathing  Heart: No obvious jugular venous distension present  Abdomen: soft, nontender, no organomegaly or masses, Body mass index is 28.75 kg/m .  : deferred      Labs and Pathology:    I personally reviewed all applicable laboratory data and went over findings with patient  Significant for:      UA RESULTS:   Recent Labs   Lab Test 10/18/19  1050   SG 1.005   URINEPH 6.0   NITRITE Negative   RBCU <1   WBCU <1         Imaging:    I personally reviewed all applicable imaging and went over findings with patient.  Significant for:    Results for orders placed or performed in visit on 03/11/22   US Testicular & Scrotum w Doppler Ltd    Narrative    US TESTICULAR AND SCROTUM WITH DOPPLER LIMITED  3/11/2022 7:07 AM      CLINICAL HISTORY: Scrotal pain    COMPARISON: 9/9/2019        PROCEDURE COMMENTS: Ultrasound of the scrotum was performed with color  and spectral Doppler.    FINDINGS:  Right testis: 3.6 x 2.6 x 5.3 cm   Left testis: 3.3 x 2.6 x 5.2 cm    The testes are normal in size, shape, and echotexture, and are located  within the scrotum. The epididymides are normal. There is no  hydrocele, or varicocele demonstrated.    There is normal testicular blood flow as documented by both color  Doppler evaluation and spectral Doppler waveforms.  There is no  evidence of testicular torsion.      Impression    IMPRESSION: Testicular ultrasound " within normal limits    I have personally reviewed the examination and initial interpretation  and I agree with the findings.    LENNY DRUMMOND MD         SYSTEM ID:  [id number]

## 2022-04-05 ENCOUNTER — OFFICE VISIT (OUTPATIENT)
Dept: UROLOGY | Facility: CLINIC | Age: 37
End: 2022-04-05
Payer: COMMERCIAL

## 2022-04-05 VITALS
WEIGHT: 230 LBS | SYSTOLIC BLOOD PRESSURE: 132 MMHG | DIASTOLIC BLOOD PRESSURE: 81 MMHG | BODY MASS INDEX: 28.6 KG/M2 | HEART RATE: 80 BPM | HEIGHT: 75 IN

## 2022-04-05 DIAGNOSIS — N50.811 TESTICULAR PAIN, RIGHT: Primary | ICD-10-CM

## 2022-04-05 PROCEDURE — 99213 OFFICE O/P EST LOW 20 MIN: CPT | Performed by: NURSE PRACTITIONER

## 2022-04-05 RX ORDER — DOXYCYCLINE HYCLATE 100 MG
100 TABLET ORAL 2 TIMES DAILY
Qty: 14 TABLET | Refills: 0 | Status: SHIPPED | OUTPATIENT
Start: 2022-04-05 | End: 2022-04-12

## 2022-04-05 ASSESSMENT — PAIN SCALES - GENERAL: PAINLEVEL: NO PAIN (0)

## 2022-04-05 NOTE — PATIENT INSTRUCTIONS
UROLOGY CLINIC VISIT PATIENT INSTRUCTIONS    -Complete one additional course of doxycycline.   -Work on increasing your physical activity, which will help reduce pressure on your pelvic floor muscles.   -Let me know if symptoms do not improve. I can place a referral to pelvic floor physical therapy for evaluation.     If you have any issues, questions or concerns in the meantime, do not hesitate to contact us at [phone] or via [employer].     Nida Kimble, CNP  Department of Urology

## 2022-04-05 NOTE — LETTER
"4/5/2022       RE: Terrance Larsen  [address] [facility] [address]     Dear Colleague,    Thank you for referring your patient, Terrance Larsen, to the [facility] UROLOGY CLINIC [place] at [facility]. Please see a copy of my visit note below.         Assessment and Plan:     Assessment: 36 year old male with a history of testicular pain and hematospermia, with recurrent pain that resolved partially with a course of doxycycline to cover epididymitis. Scrotal US negative. Continues to have intermittent symptoms. Differentials at this point include residual epididymitis versus pelvic floor tension and we discussed both today. We can trial an additional course of doxycycline to see how his symptoms respond. Also encouraged him to increase his physical activity and avoid tight, constricting clothing. Could involve PFPT if symptoms continue.     Plan:  -Take additional 7-day course of doxycycline.  -Increase physical activity.   -Follow up with me as needed. If symptoms continue despite the above, will consider PFPT referral.       Nida Kimble, CNP  Department of Urology           Chief Complaint:   Testicular pain         History of Present Illness:    Terrance Larsen is a 36 year old male with a history of testicular pain and hematospermia, s/p negative TRUS with Dr. Hawley and testicular ultrasound showing mild bilateral varicoceles in 2019. He presents for follow up today regarding recurrent scrotal pain. Repeat ultrasound obtained last month and is WNL. He did take a course of doxycycline for epididymitis and noticed some improvement. Now, for the last few days, his pain has come back intermittently.     His job is more sedentary during the winter, so he is starting to increase his activity now. He has gained a little weight and has noticed some pairs of his pants fit a little tighter. His work vehicle is also small and he has to \"cram in.\" Denies " "any ongoing hematospermia.          Past Medical History:   No past medical history on file.         Past Surgical History:   No past surgical history on file.         Medications     Current Outpatient Medications   Medication     amLODIPine (NORVASC) 5 MG tablet     doxycycline hyclate (VIBRA-TABS) 100 MG tablet     Multiple Vitamins-Minerals (CENTROVITE) TABS     doxycycline hyclate (VIBRA-TABS) 100 MG tablet     No current facility-administered medications for this visit.            Allergies:   Codeine         Review of Systems:  From intake questionnaire   Negative 14 system review except as noted on HPI, nurse's note.         Physical Exam:   Patient is a 36 year old  male   Vitals: Blood pressure 132/81, pulse 80, height 1.905 m (6' 3\"), weight 104.3 kg (230 lb).  General Appearance Adult: Alert, no acute distress, oriented  Lungs: no respiratory distress, or pursed lip breathing  Heart: No obvious jugular venous distension present  Abdomen: soft, nontender, no organomegaly or masses, Body mass index is 28.75 kg/m .  : deferred      Labs and Pathology:    I personally reviewed all applicable laboratory data and went over findings with patient  Significant for:      UA RESULTS:   Recent Labs   Lab Test 10/18/19  1050   SG 1.005   URINEPH 6.0   NITRITE Negative   RBCU <1   WBCU <1         Imaging:    I personally reviewed all applicable imaging and went over findings with patient.  Significant for:    Results for orders placed or performed in visit on 03/11/22   US Testicular & Scrotum w Doppler Ltd    Narrative    US TESTICULAR AND SCROTUM WITH DOPPLER LIMITED  3/11/2022 7:07 AM      CLINICAL HISTORY: Scrotal pain    COMPARISON: 9/9/2019        PROCEDURE COMMENTS: Ultrasound of the scrotum was performed with color  and spectral Doppler.    FINDINGS:  Right testis: 3.6 x 2.6 x 5.3 cm   Left testis: 3.3 x 2.6 x 5.2 cm    The testes are normal in size, shape, and echotexture, and are located  within the " scrotum. The epididymides are normal. There is no  hydrocele, or varicocele demonstrated.    There is normal testicular blood flow as documented by both color  Doppler evaluation and spectral Doppler waveforms.  There is no  evidence of testicular torsion.      Impression    IMPRESSION: Testicular ultrasound within normal limits    I have personally reviewed the examination and initial interpretation  and I agree with the findings.    LENNY DRUMMOND MD         SYSTEM ID:  [id number]       Again, thank you for allowing me to participate in the care of your patient.      Sincerely,    Nida Kimble, CNP

## 2022-05-08 ENCOUNTER — HEALTH MAINTENANCE LETTER (OUTPATIENT)
Age: 37
End: 2022-05-08

## 2023-01-08 ENCOUNTER — HEALTH MAINTENANCE LETTER (OUTPATIENT)
Age: 38
End: 2023-01-08

## 2023-06-02 ENCOUNTER — HEALTH MAINTENANCE LETTER (OUTPATIENT)
Age: 38
End: 2023-06-02

## 2024-05-16 ENCOUNTER — LAB (OUTPATIENT)
Dept: LAB | Facility: CLINIC | Age: 39
End: 2024-05-16
Payer: COMMERCIAL

## 2024-05-16 DIAGNOSIS — Z31.41 ENCOUNTER FOR SPERM COUNT FOR FERTILITY TESTING: ICD-10-CM

## 2024-05-16 PROCEDURE — 89322 SEMEN ANAL STRICT CRITERIA: CPT

## 2024-05-17 LAB
ABNORMAL SPERM MORPHOLOGY: 95
ABSTINENCE DAYS: 6 DAYS (ref 2–7)
AGGLUTINATION: NO
ANALYSIS TEMP - CENTIGRADE: 23 CENTIGRADE
COLLECTION METHOD: NORMAL
COLLECTION SITE: NORMAL
CONSENT TO RELEASE TO PARTNER: YES
DAL- RECEIVED TIME: NORMAL
HEAD DEFECT: 95 %
IMMOTILE: 29 %
LIQUEFIED: YES
MIDPIECE DEFECT: 39 %
NON-PROGRESSIVE MOTILITY: 5 %
NORMAL SPERM MORPHOLOGY: 5 % NORMAL FORMS
PROGRESSIVE MOTILITY: 66 %
ROUND CELLS: 0.8 MILLION/ML
SPECIMEN PH: 7.2 PH
SPECIMEN VOLUME: 3.5 ML
SPERM CONCENTRATION: 129 MILLION/ML
TAIL DEFECT: 6 %
TIME OF ANALYSIS: NORMAL
TOTAL PROGRESSIVE MOTILE NUMBER: 298 MILLION
TOTAL SPERM NUMBER: 452 MILLION
VISCOUS: NO
VITALITY: NORMAL

## 2024-06-30 ENCOUNTER — HEALTH MAINTENANCE LETTER (OUTPATIENT)
Age: 39
End: 2024-06-30

## 2025-07-02 NOTE — LETTER
Date:September 10, 2019      Patient was self referred, no letter generated. Do not send.        [facility] Information       Follow up phone call to patient to discuss Gyn cancer conference recommendations. Discussed that she will need a colonoscopy to further delineate transverse colonic nodule, pending colonoscopy results we will then be able to formulate plan either upfront surgery or neoadjuvant chemotherapy. Discussed once we have colonoscopy results then will be able to discuss plan in more detail. Paty agrees to have colonoscopy. I will place a referral to GI and she is aware that they will contact her to schedule. Patient states this is all very overwhelming to her, but verbalized understanding of plan.  Reassurance and support provided and she was encouraged to call with any questions or concerns.

## 2025-07-13 ENCOUNTER — HEALTH MAINTENANCE LETTER (OUTPATIENT)
Age: 40
End: 2025-07-13